# Patient Record
(demographics unavailable — no encounter records)

---

## 2025-06-18 NOTE — PHYSICAL EXAM
[No Acute Distress] : no acute distress [Well Nourished] : well nourished [PERRL] : pupils equal round and reactive to light [Normal Oropharynx] : the oropharynx was normal [No Lymphadenopathy] : no lymphadenopathy [Supple] : supple [No Accessory Muscle Use] : no accessory muscle use [Clear to Auscultation] : lungs were clear to auscultation bilaterally [Regular Rhythm] : with a regular rhythm [Normal S1, S2] : normal S1 and S2 [No Murmur] : no murmur heard [No Edema] : there was no peripheral edema [Normal Appearance] : normal in appearance [No Nipple Discharge] : no nipple discharge [No Axillary Lymphadenopathy] : no axillary lymphadenopathy [Soft] : abdomen soft [Non Tender] : non-tender [Non-distended] : non-distended [No Masses] : no abdominal mass palpated [No HSM] : no HSM [Normal Bowel Sounds] : normal bowel sounds [Normal Supraclavicular Nodes] : no supraclavicular lymphadenopathy [Normal Axillary Nodes] : no axillary lymphadenopathy [Normal Posterior Cervical Nodes] : no posterior cervical lymphadenopathy [Normal Anterior Cervical Nodes] : no anterior cervical lymphadenopathy [Normal Affect] : the affect was normal [Normal Insight/Judgement] : insight and judgment were intact

## 2025-06-18 NOTE — HISTORY OF PRESENT ILLNESS
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

## 2025-06-18 NOTE — HEALTH RISK ASSESSMENT
[Patient reported mammogram was normal] : Patient reported mammogram was normal [Patient reported PAP Smear was normal] : Patient reported PAP Smear was normal [Patient reported colonoscopy was normal] : Patient reported colonoscopy was normal [HIV test declined] : HIV test declined [MammogramDate] : 5/25 [PapSmearDate] : 6/24 [ColonoscopyDate] : 11/23

## 2025-06-18 NOTE — ASSESSMENT
[Vaccines Reviewed] : Immunizations reviewed today. Please see immunization details in the vaccine log within the immunization flowsheet.  [FreeTextEntry1] : 46F c history of benign R. lumpectomy, as per pt history of benign L. breast biopsy in 2023, chronic thrombocytopenia, tubular adenoma (11/23) here for cpe  check fasting BW physical ecg performed - ecg sinus bradycardia fatigue likely 2/2 interrupted sleep - gests 7 hours but wakes up at 3:50am then falls back asleep - can try chamomille tea, melatonin 3-5mg qhs prn; if persists may consider trazodone which pt states had taken in the distant past  [facility]o exam[facility] and pap smear pending utd with screening colonoscopy 1/23 - due in 2026 had tdap in 2015 - advised tdap, pt states will return for tdap vaccine   rtc in 1year for cpe o rprn.